# Patient Record
Sex: MALE | Race: ASIAN
[De-identification: names, ages, dates, MRNs, and addresses within clinical notes are randomized per-mention and may not be internally consistent; named-entity substitution may affect disease eponyms.]

---

## 2021-12-20 PROBLEM — Z00.00 ENCOUNTER FOR PREVENTIVE HEALTH EXAMINATION: Status: ACTIVE | Noted: 2021-12-20

## 2021-12-21 ENCOUNTER — APPOINTMENT (OUTPATIENT)
Dept: INTERNAL MEDICINE | Facility: CLINIC | Age: 26
End: 2021-12-21
Payer: COMMERCIAL

## 2021-12-21 VITALS
RESPIRATION RATE: 16 BRPM | HEIGHT: 72 IN | DIASTOLIC BLOOD PRESSURE: 79 MMHG | TEMPERATURE: 98 F | WEIGHT: 194.8 LBS | OXYGEN SATURATION: 98 % | HEART RATE: 73 BPM | BODY MASS INDEX: 26.38 KG/M2 | SYSTOLIC BLOOD PRESSURE: 128 MMHG

## 2021-12-21 DIAGNOSIS — G47.00 INSOMNIA, UNSPECIFIED: ICD-10-CM

## 2021-12-21 DIAGNOSIS — M25.561 PAIN IN RIGHT KNEE: ICD-10-CM

## 2021-12-21 PROCEDURE — 99385 PREV VISIT NEW AGE 18-39: CPT | Mod: GC

## 2021-12-22 NOTE — END OF VISIT
[] : Resident [FreeTextEntry3] : 26 year old male presenting to establish care \par Acute complaint for right knee pain, present for several weeks after volleyball injury \par Unable to complete physical exam with patient, had to run to a meeting and could not complete full visit with myself, wants to return for blood work if needed\par Gout- notes that it is controlled with colchicine and allopurinol, would rec BMP and uric acid when he returns \par Right knee pain- referred to sports medicine\par Insomnia- takes benzo infrequently, increased stress with work and being a newfather, next visit would discuss sleep hygiene further  [Time Spent: ___ minutes] : I have spent [unfilled] minutes of time on the encounter.

## 2021-12-22 NOTE — PHYSICAL EXAM
[Normal] : soft, non-tender, non-distended, no masses palpated, no HSM and normal bowel sounds [No Joint Swelling] : no joint swelling [Grossly Normal Strength/Tone] : grossly normal strength/tone [de-identified] : Normal appearing right foot and toes; Normal appearing right knee; No tenderness to palpation, no erythema; no gouty tophi

## 2021-12-22 NOTE — ASSESSMENT
[FreeTextEntry1] : \par 26M w/ PMHx gout and insomnia presents to establish care with new insurance coverage and complaining of 3-week history of right knee pain/soreness s/p volleyball injury.\par \par #Right knee pain\par - Likely knee sprain vs. possible meniscal injury\par - Sports medicine referral\par \par #Gout\par - Gout flares the past several months have become more frequent, occurring weekly\par - Flares incited by red meat, wine, dehydration, lack of sleep, and stress\par - Pain occurs in his right big toe usually as well as left big toe and ankle.\par - C/w allopurinol 300 daily and colchicine 0.6 PRN flare\par \par #Insomnia\par - Alprazolam previously prescribed by a psychiatrist he saw several months ago\par - Continue treatment of insomnia per psychiatrist\par \par #HCM\par - F/u flu shot, COVID vaccine, and general vaccine status at next visit

## 2021-12-22 NOTE — HISTORY OF PRESENT ILLNESS
[de-identified] : 26M w/ PMHx gout and insomnia presents to establish care with new insurance coverage and complaining of 3-week history of right knee pain/soreness s/p volleyball injury. Previous PCP was at [place]. Patient has had history of gout which was managed by his PCP. Home meds are allopurinol 300 daily and colchicine 0.6 PRN gout flare. His gout flares were getting less frequent in recent years but the past several months have become more frequent, occurring weekly. Especially incited by red meat, wine, dehydration, lack of sleep, and stress. Gout pain occurs in his right big toe usually as well as left big toe and ankle. Recent stressor has been his wife's expecting a baby. His insomnia is treated with alprazolam 0.5 PRN. He takes roughly one pill every 2 weeks. He has tried sleep hygiene, melatonin, and ambien but only benzodiazepines are effective. Reports still averaging 7 hours of sleep per night. His recent knee soreness began when he landed awkwardly while playing volleyball several weeks ago. It continues to cause significant pain that makes it painful to walk. He is active daily, participating in volleyball, yoga and cycling. 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

## 2021-12-29 ENCOUNTER — APPOINTMENT (OUTPATIENT)
Dept: INTERNAL MEDICINE | Facility: CLINIC | Age: 26
End: 2021-12-29
Payer: COMMERCIAL

## 2021-12-29 DIAGNOSIS — M10.9 GOUT, UNSPECIFIED: ICD-10-CM

## 2021-12-29 PROCEDURE — 36415 COLL VENOUS BLD VENIPUNCTURE: CPT

## 2021-12-30 LAB
ANION GAP SERPL CALC-SCNC: 13 MMOL/L
BUN SERPL-MCNC: 14 MG/DL
CALCIUM SERPL-MCNC: 9.4 MG/DL
CHLORIDE SERPL-SCNC: 102 MMOL/L
CO2 SERPL-SCNC: 25 MMOL/L
CREAT SERPL-MCNC: 1.03 MG/DL
GLUCOSE SERPL-MCNC: 91 MG/DL
POTASSIUM SERPL-SCNC: 4.5 MMOL/L
SODIUM SERPL-SCNC: 140 MMOL/L
URATE SERPL-MCNC: 6.2 MG/DL

## 2022-05-17 ENCOUNTER — APPOINTMENT (OUTPATIENT)
Dept: INTERNAL MEDICINE | Facility: CLINIC | Age: 27
End: 2022-05-17

## 2022-06-07 RX ORDER — COLCHICINE 0.6 MG/1
0.6 TABLET ORAL
Qty: 180 | Refills: 0 | Status: ACTIVE | COMMUNITY
Start: 2021-12-23 | End: 1900-01-01

## 2022-06-07 RX ORDER — ALLOPURINOL 300 MG/1
300 TABLET ORAL DAILY
Qty: 90 | Refills: 0 | Status: ACTIVE | COMMUNITY
Start: 2021-12-23 | End: 1900-01-01